# Patient Record
Sex: FEMALE
[De-identification: names, ages, dates, MRNs, and addresses within clinical notes are randomized per-mention and may not be internally consistent; named-entity substitution may affect disease eponyms.]

---

## 2017-08-29 ENCOUNTER — HOSPITAL ENCOUNTER (OUTPATIENT)
Dept: HOSPITAL 39 - MAMMO | Age: 42
End: 2017-08-29
Payer: MEDICAID

## 2017-08-29 DIAGNOSIS — Z12.31: Primary | ICD-10-CM

## 2017-08-29 PROCEDURE — 77063 BREAST TOMOSYNTHESIS BI: CPT

## 2017-08-30 NOTE — MAM
EXAM DESCRIPTION: 

3D Screening BILATERAL



CLINICAL HISTORY: 

42 yearsFemaleSCREENING pain in the left breast. No family

history of breast cancer. Premenopausal..



COMPARISON: 

Baseline study at this facility.  No prior reports available.  



TECHNIQUE: 

Bilateral CC and MLO projection full-field images, 3-D

tomosynthesis digital mammographic technique. Also bilateral 

synthesized CC/ MLO full-field images.  CAD not utilized.



FINDINGS: 

The breast parenchymal density pattern is: Heterogeneously dense

breast tissue, which may obscure small masses.  No skin

thickening or nipple retraction  left axillary lymph node.

No focal, stellate mass or density, focal asymmetry , and no

suspicious microcalcifications bilaterally. 



IMPRESSION: 

BI-RADS CATEGORY: 2 - BENIGN FINDINGS.

FOLLOW UP: Routine digital bilateral  screening, one year

interval from date



Written communication explaining the findings and follow-up, will

be mailed to the patient and referring health care provider.



According to the [employer], yearly mammograms

are recommended starting at age 40 and continuing as long as a

woman is in good health.  Any breast change noted on a breast

self-exam should be reported promptly to the patient's healthcare

provider.  Breast MRI is recommended for women with an

approximately 20-25% or greater lifetime risk of breast cancer,

including women with a strong family history of breast or ovarian

cancer and women who have been treated for Hodgkin's disease.



A negative mammographic report should not delay tissue diagnosis

in patients with significant clinical history or physical

findings.



Extremely dense breast tissue limits the sensitivity of digital

mammography. 



Electronically signed by:  Durga Li MD  8/30/2017 11:20 AM

CDT Workstation: [id number]

## 2018-01-27 ENCOUNTER — HOSPITAL ENCOUNTER (EMERGENCY)
Dept: HOSPITAL 39 - ER | Age: 43
Discharge: HOME | End: 2018-01-27
Payer: COMMERCIAL

## 2018-01-27 VITALS — OXYGEN SATURATION: 97 % | SYSTOLIC BLOOD PRESSURE: 129 MMHG | DIASTOLIC BLOOD PRESSURE: 79 MMHG

## 2018-01-27 VITALS — TEMPERATURE: 99 F

## 2018-01-27 DIAGNOSIS — K29.00: Primary | ICD-10-CM

## 2018-01-27 PROCEDURE — 36415 COLL VENOUS BLD VENIPUNCTURE: CPT

## 2018-01-27 PROCEDURE — 80053 COMPREHEN METABOLIC PANEL: CPT

## 2018-01-27 PROCEDURE — 83690 ASSAY OF LIPASE: CPT

## 2018-01-27 PROCEDURE — 81001 URINALYSIS AUTO W/SCOPE: CPT

## 2018-01-27 PROCEDURE — 87086 URINE CULTURE/COLONY COUNT: CPT

## 2018-01-27 PROCEDURE — 85025 COMPLETE CBC W/AUTO DIFF WBC: CPT

## 2018-01-27 NOTE — ED.PDOC
History of Present Illness





- General


Chief Complaint: Abdominal Pain


Stated Complaint: ABDOMINAL PAIN, NAUSEA


Time Seen by Provider: 01/27/18 10:36


Information Source: patient


Exam Limitations: no limitations





- History of Present Illness


Abdominal Pain Onset Location: epigastric


Pain Radiation: back


Quality: severe, sharpness, waxing/waning


Timing/Duration: 24 hours


Improving Factors: nothing


Worsening Factors: nothing


Associated Symptoms: back pain, diarrhea, nausea/vomiting - no vomiting





Review of Systems





- Review of Systems


Constitutional: States: see HPI.  Denies: fever


EENTM: States: no symptoms reported.  Denies: nose congestion, throat pain


Respiratory: States: no symptoms reported.  Denies: cough, short of breath


Cardiology: States: no symptoms reported.  Denies: chest pain


Gastrointestinal/Abdominal: States: abdominal pain, diarrhea, nausea.  Denies: 

vomiting


Genitourinary: States: no symptoms reported.  Denies: dysuria, frequency


Musculoskeletal: States: no symptoms reported.  Denies: joint pain, muscle pain


Skin: States: no symptoms reported.  Denies: rash


Neurological: States: no symptoms reported


Endocrine: States: no symptoms reported





Past Medical History (General)





- Patient Medical History


Hx Seizures: No


Hx Dementia: No


Hx Asthma: No


Hx of COPD: No


Hx Congestive Heart Failure: No


Hx Pacemaker: No


Hx Hypertension: No


Hx Thyroid Disease: No


Hx Diabetes: No


Hx Gastroesophageal Reflux: No


Surgical History: no surgical history





Family Medical History





- Family History


  ** Mother


Family History: No Known





Physical Exam





- Physical Exam


General Appearance: Alert, Obvious distress


Eyes, Ears, Nose, Throat Exam: PERRL/EOMI, pharynx normal


Neck: non-tender, full range of motion, supple


Respiratory: chest non-tender, lungs clear, normal breath sounds


Cardiovascular/Chest: normal peripheral pulses, regular rate, rhythm, no edema


Gastrointestinal/Abdominal: normal bowel sounds, soft, tenderness - tenderness 

epigastric without guarding or rebound


Back Exam: normal inspection, no CVA tenderness


Extremity: normal range of motion, non-tender, no pedal edema





Departure





- Departure


Clinical Impression: 


Gastritis


Qualifiers:


 Gastritis type: unspecified gastritis Chronicity: acute Gastritis bleeding: 

without bleeding Qualified Code(s): K29.00 - Acute gastritis without bleeding





Disposition: Discharge to Home or Self Care


Condition: Fair


Departure Forms:  ED Discharge - Pt. Copy, Patient Portal Self Enrollment


Instructions:  DI for Abdominal Pain-Adult


Prescriptions: 


Hyoscyamine Sulfate [Levsin/Sl] 0.125 mg SL Q4HR PRN #15 sub


 PRN Reason: Abdominal Cramping


Ondansetron Tab [Zofran Tab] 4 mg PO Q4HR PRN #15 tab


 PRN Reason: Nausea/Vomiting


Pantoprazole Sodium [Protonix] 20 mg PO ACBK #20 tab


Home Medications: 


Ambulatory Orders





Hyoscyamine Sulfate [Levsin/Sl] 0.125 mg SL Q4HR PRN #15 sub 01/27/18 


Ondansetron Tab [Zofran Tab] 4 mg PO Q4HR PRN #15 tab 01/27/18 


Pantoprazole Sodium [Protonix] 20 mg PO ACBK #20 tab 01/27/18

## 2020-06-11 ENCOUNTER — HOSPITAL ENCOUNTER (OUTPATIENT)
Dept: HOSPITAL 39 - ECHO | Age: 45
End: 2020-06-11
Attending: NURSE PRACTITIONER
Payer: COMMERCIAL

## 2020-06-11 DIAGNOSIS — R01.1: Primary | ICD-10-CM

## 2020-06-22 ENCOUNTER — HOSPITAL ENCOUNTER (OUTPATIENT)
Dept: HOSPITAL 39 - US | Age: 45
End: 2020-06-22
Attending: NURSE PRACTITIONER
Payer: COMMERCIAL

## 2020-06-22 DIAGNOSIS — D25.9: Primary | ICD-10-CM

## 2020-06-22 DIAGNOSIS — N94.10: ICD-10-CM

## 2020-06-22 NOTE — US
EXAM DESCRIPTION: 

Pelvis Transvaginal: Ultrasound.



CLINICAL HISTORY: 

44 years Female DYSPAREUNIA. LMP unknown.



COMPARISON: 

None.



TECHNIQUE: 

Endovaginal scanning; Gray-scale and Doppler modes.



FINDINGS: 

Uterus 9.4 x 5.8 x 5.9 cm 169.9 mL.. Endometrial thickness 6.1

mm. Myometrium heterogeneous.. Anterior slightly hypoechoic mass

abutting the anterior endometrium and the capsule, measuring 3.4

x 2.6 x 2.2 cm. Minimal vascularity. Echogenic mass more

posterior measuring 2.1 x 2.0 x 1.9 cm. Hypoechoic mass measuring

2.1 x 1.5 x 1.5 cm. Uterus  not retroflexed. Cervix

unremarkable.. Cul-de-sac no fluid.

Right ovary 2.0 x 1.4 x 1.0 cm 1.4 mm.. Normal color Doppler

vascularity. No follicles or cysts. No adnexal mass or free

fluid. 

Left ovary not well visualized. cm.  No adnexal mass or free

fluid.



IMPRESSION: 

1. Uterus is enlarged with at least 3 fibroids. No endometrial

thickening or fluid. Normal position.

2. Right ovary negative. Left ovary not seen. No adnexal mass or

free fluid.



Electronically signed by:  Durga Li MD  6/22/2020 5:12 PM CDT

Workstation: [id number]